# Patient Record
Sex: MALE | Race: BLACK OR AFRICAN AMERICAN | NOT HISPANIC OR LATINO | URBAN - METROPOLITAN AREA
[De-identification: names, ages, dates, MRNs, and addresses within clinical notes are randomized per-mention and may not be internally consistent; named-entity substitution may affect disease eponyms.]

---

## 2017-05-18 ENCOUNTER — EMERGENCY (EMERGENCY)
Facility: HOSPITAL | Age: 27
LOS: 1 days | End: 2017-05-18
Payer: SELF-PAY

## 2017-05-18 PROCEDURE — 99283 EMERGENCY DEPT VISIT LOW MDM: CPT | Mod: 25

## 2017-05-18 PROCEDURE — 12011 RPR F/E/E/N/L/M 2.5 CM/<: CPT

## 2017-09-16 ENCOUNTER — EMERGENCY (EMERGENCY)
Facility: HOSPITAL | Age: 27
LOS: 1 days | End: 2017-09-16
Payer: COMMERCIAL

## 2017-09-16 PROCEDURE — 99284 EMERGENCY DEPT VISIT MOD MDM: CPT | Mod: 25

## 2017-09-16 PROCEDURE — 73110 X-RAY EXAM OF WRIST: CPT | Mod: 26,RT

## 2017-09-16 PROCEDURE — 25622 CLTX CARPL SCPHD FX W/O MNPJ: CPT | Mod: 54

## 2017-09-28 PROBLEM — Z00.00 ENCOUNTER FOR PREVENTIVE HEALTH EXAMINATION: Status: ACTIVE | Noted: 2017-09-28

## 2017-09-29 ENCOUNTER — APPOINTMENT (OUTPATIENT)
Dept: ORTHOPEDIC SURGERY | Facility: CLINIC | Age: 27
End: 2017-09-29
Payer: COMMERCIAL

## 2017-09-29 VITALS
DIASTOLIC BLOOD PRESSURE: 76 MMHG | BODY MASS INDEX: 21.43 KG/M2 | SYSTOLIC BLOOD PRESSURE: 125 MMHG | HEART RATE: 87 BPM | WEIGHT: 176 LBS | HEIGHT: 76 IN

## 2017-09-29 DIAGNOSIS — F17.200 NICOTINE DEPENDENCE, UNSPECIFIED, UNCOMPLICATED: ICD-10-CM

## 2017-09-29 DIAGNOSIS — S62.021A DISPLACED FRACTURE OF MIDDLE THIRD OF NAVICULAR [SCAPHOID] BONE OF RIGHT WRIST, INITIAL ENCOUNTER FOR CLOSED FRACTURE: ICD-10-CM

## 2017-09-29 PROCEDURE — 29075 APPL CST ELBW FNGR SHORT ARM: CPT | Mod: RT

## 2017-09-29 PROCEDURE — 99203 OFFICE O/P NEW LOW 30 MIN: CPT | Mod: 25

## 2017-09-29 PROCEDURE — 73110 X-RAY EXAM OF WRIST: CPT | Mod: RT

## 2017-09-29 PROCEDURE — A4590: CPT | Mod: RT

## 2017-10-30 ENCOUNTER — APPOINTMENT (OUTPATIENT)
Dept: ORTHOPEDIC SURGERY | Facility: CLINIC | Age: 27
End: 2017-10-30
Payer: COMMERCIAL

## 2017-10-30 VITALS
DIASTOLIC BLOOD PRESSURE: 76 MMHG | HEIGHT: 76 IN | SYSTOLIC BLOOD PRESSURE: 128 MMHG | HEART RATE: 80 BPM | BODY MASS INDEX: 21.31 KG/M2 | WEIGHT: 175 LBS

## 2017-10-30 PROCEDURE — 99213 OFFICE O/P EST LOW 20 MIN: CPT | Mod: 25

## 2017-10-30 PROCEDURE — A4590: CPT

## 2017-10-30 PROCEDURE — 29075 APPL CST ELBW FNGR SHORT ARM: CPT | Mod: RT

## 2017-10-30 PROCEDURE — 73110 X-RAY EXAM OF WRIST: CPT | Mod: 26,RT

## 2017-11-16 ENCOUNTER — APPOINTMENT (OUTPATIENT)
Dept: ORTHOPEDIC SURGERY | Facility: CLINIC | Age: 27
End: 2017-11-16
Payer: COMMERCIAL

## 2017-11-16 VITALS
SYSTOLIC BLOOD PRESSURE: 122 MMHG | HEIGHT: 76 IN | DIASTOLIC BLOOD PRESSURE: 74 MMHG | BODY MASS INDEX: 21.31 KG/M2 | HEART RATE: 74 BPM | WEIGHT: 175 LBS

## 2017-11-16 PROCEDURE — 73110 X-RAY EXAM OF WRIST: CPT | Mod: 26,RT

## 2017-11-16 PROCEDURE — 99213 OFFICE O/P EST LOW 20 MIN: CPT

## 2018-04-09 ENCOUNTER — APPOINTMENT (OUTPATIENT)
Dept: ORTHOPEDIC SURGERY | Facility: CLINIC | Age: 28
End: 2018-04-09
Payer: COMMERCIAL

## 2018-04-09 VITALS
WEIGHT: 175 LBS | SYSTOLIC BLOOD PRESSURE: 128 MMHG | DIASTOLIC BLOOD PRESSURE: 72 MMHG | HEART RATE: 70 BPM | BODY MASS INDEX: 21.31 KG/M2 | HEIGHT: 76 IN

## 2018-04-09 PROCEDURE — 99214 OFFICE O/P EST MOD 30 MIN: CPT

## 2018-04-09 PROCEDURE — 73110 X-RAY EXAM OF WRIST: CPT | Mod: 26,RT

## 2018-04-10 ENCOUNTER — OUTPATIENT (OUTPATIENT)
Dept: OUTPATIENT SERVICES | Facility: HOSPITAL | Age: 28
LOS: 1 days | End: 2018-04-10
Payer: COMMERCIAL

## 2018-04-10 PROCEDURE — 73200 CT UPPER EXTREMITY W/O DYE: CPT | Mod: 26,RT

## 2018-04-12 ENCOUNTER — EMERGENCY (EMERGENCY)
Facility: HOSPITAL | Age: 28
LOS: 1 days | End: 2018-04-12
Payer: COMMERCIAL

## 2018-04-12 PROCEDURE — 71045 X-RAY EXAM CHEST 1 VIEW: CPT | Mod: 26

## 2018-04-12 PROCEDURE — 99284 EMERGENCY DEPT VISIT MOD MDM: CPT

## 2018-04-18 PROBLEM — S62.024D CLOSED NONDISPLACED FRACTURE OF MIDDLE THIRD OF SCAPHOID OF RIGHT WRIST WITH ROUTINE HEALING, SUBSEQUENT ENCOUNTER: Status: ACTIVE | Noted: 2017-10-30

## 2018-04-19 ENCOUNTER — APPOINTMENT (OUTPATIENT)
Dept: ORTHOPEDIC SURGERY | Facility: CLINIC | Age: 28
End: 2018-04-19
Payer: COMMERCIAL

## 2018-04-19 VITALS — HEIGHT: 76 IN | BODY MASS INDEX: 21.31 KG/M2 | WEIGHT: 175 LBS

## 2018-04-19 DIAGNOSIS — S62.024D NONDISPLACED FRACTURE OF MIDDLE THIRD OF NAVICULAR [SCAPHOID] BONE OF RIGHT WRIST, SUBSEQUENT ENCOUNTER FOR FRACTURE WITH ROUTINE HEALING: ICD-10-CM

## 2018-04-19 PROCEDURE — 99214 OFFICE O/P EST MOD 30 MIN: CPT

## 2018-07-17 ENCOUNTER — OUTPATIENT (OUTPATIENT)
Dept: OUTPATIENT SERVICES | Facility: HOSPITAL | Age: 28
LOS: 1 days | End: 2018-07-17
Payer: COMMERCIAL

## 2018-07-17 PROCEDURE — 73721 MRI JNT OF LWR EXTRE W/O DYE: CPT | Mod: 26,RT

## 2018-09-06 ENCOUNTER — EMERGENCY (EMERGENCY)
Facility: HOSPITAL | Age: 28
LOS: 1 days | End: 2018-09-06
Payer: COMMERCIAL

## 2018-09-06 PROCEDURE — 99283 EMERGENCY DEPT VISIT LOW MDM: CPT | Mod: 25

## 2018-09-06 PROCEDURE — 12011 RPR F/E/E/N/L/M 2.5 CM/<: CPT

## 2020-05-25 ENCOUNTER — EMERGENCY (EMERGENCY)
Facility: HOSPITAL | Age: 30
LOS: 1 days | End: 2020-05-25
Admitting: EMERGENCY MEDICINE
Payer: COMMERCIAL

## 2020-05-25 PROCEDURE — 99285 EMERGENCY DEPT VISIT HI MDM: CPT

## 2020-05-25 PROCEDURE — 72193 CT PELVIS W/DYE: CPT | Mod: 26

## 2024-04-19 ENCOUNTER — APPOINTMENT (OUTPATIENT)
Dept: ORTHOPEDIC SURGERY | Facility: CLINIC | Age: 34
End: 2024-04-19
Payer: COMMERCIAL

## 2024-04-19 VITALS — BODY MASS INDEX: 21.25 KG/M2 | HEIGHT: 77 IN | WEIGHT: 180 LBS

## 2024-04-19 DIAGNOSIS — M25.531 PAIN IN RIGHT WRIST: ICD-10-CM

## 2024-04-19 PROCEDURE — 73110 X-RAY EXAM OF WRIST: CPT | Mod: RT

## 2024-04-19 PROCEDURE — 99203 OFFICE O/P NEW LOW 30 MIN: CPT

## 2024-05-01 ENCOUNTER — RESULT REVIEW (OUTPATIENT)
Age: 34
End: 2024-05-01

## 2024-05-10 ENCOUNTER — APPOINTMENT (OUTPATIENT)
Dept: ORTHOPEDIC SURGERY | Facility: CLINIC | Age: 34
End: 2024-05-10
Payer: COMMERCIAL

## 2024-05-10 VITALS — WEIGHT: 180 LBS | BODY MASS INDEX: 21.25 KG/M2 | HEIGHT: 77 IN

## 2024-05-10 DIAGNOSIS — M25.539 PAIN IN UNSPECIFIED WRIST: ICD-10-CM

## 2024-05-10 PROCEDURE — 99213 OFFICE O/P EST LOW 20 MIN: CPT

## 2024-05-13 PROBLEM — M25.539 WRIST PAIN: Status: ACTIVE | Noted: 2024-04-19

## 2024-05-13 NOTE — IMAGING
[de-identified] : Right wrist with mild swelling, +ttp at ulnar styloid. Able to make fist, oppose thumb to small finger and abduct fingers. Sensation intact throughout. <2sec cap refill.  Right wrist radiographs with no overt fracture nor dislocation. Carpus aligned. (3-view) Right wrist MRI with edema at ulnar styloid, partial TFCC tear. Carpus aligned.

## 2024-05-13 NOTE — ASSESSMENT
[FreeTextEntry1] : Right wrist injury with ulnar styloid contusion - reviewed MRI, pathoanatomy with patient and discussed management ladder to include NSAIDs prn, activity modification, OT, wrist brace.  F/u 4weeks to reassess

## 2024-05-13 NOTE — HISTORY OF PRESENT ILLNESS
[de-identified] : Age: 33 year M PMHx: none Hand Dominance: RHD Chief Complaint: Right hand/wrist pain s/p trauma early April 2024. Patient states that he was working on his car when he had become frustrated, prompting him to strike his car with a closed fist. Patient states he is now having pain throughout the base of his right thumb and into his right wrist, making it difficult to even hold his phone. Patient states that he has had a previous scaphoid fracture in his right wrist. Denies numbness/tingling.  Trauma: yes Outside Imaging/Treatment: none OTC Medications: Tylenol PRN with minimal relief OT/PT: none Bracing: none Pain worse with: exertion, weight bearing Pain better with: rest  05/10/24: f/u right hand/wrist. Patient is here to review EMG results from [facility] on 05/01/24.